# Patient Record
Sex: FEMALE | Race: WHITE | Employment: OTHER | URBAN - METROPOLITAN AREA
[De-identification: names, ages, dates, MRNs, and addresses within clinical notes are randomized per-mention and may not be internally consistent; named-entity substitution may affect disease eponyms.]

---

## 2023-09-05 ENCOUNTER — TELEPHONE (OUTPATIENT)
Dept: ORTHOPEDIC SURGERY | Age: 68
End: 2023-09-05

## 2023-09-05 ENCOUNTER — OFFICE VISIT (OUTPATIENT)
Dept: ORTHOPEDIC SURGERY | Age: 68
End: 2023-09-05

## 2023-09-05 DIAGNOSIS — M25.551 RIGHT HIP PAIN: Primary | ICD-10-CM

## 2023-09-05 DIAGNOSIS — M47.816 LUMBAR SPONDYLOSIS: ICD-10-CM

## 2023-09-05 DIAGNOSIS — M51.36 DDD (DEGENERATIVE DISC DISEASE), LUMBAR: ICD-10-CM

## 2023-09-05 DIAGNOSIS — M70.71 BURSITIS OF RIGHT HIP, UNSPECIFIED BURSA: ICD-10-CM

## 2023-09-05 RX ORDER — METHYLPREDNISOLONE ACETATE 40 MG/ML
80 INJECTION, SUSPENSION INTRA-ARTICULAR; INTRALESIONAL; INTRAMUSCULAR; SOFT TISSUE ONCE
Status: COMPLETED | OUTPATIENT
Start: 2023-09-05 | End: 2023-09-05

## 2023-09-05 RX ORDER — MELOXICAM 7.5 MG/1
7.5-15 TABLET ORAL DAILY
Qty: 60 TABLET | Refills: 1 | Status: SHIPPED | OUTPATIENT
Start: 2023-09-05

## 2023-09-05 RX ADMIN — METHYLPREDNISOLONE ACETATE 80 MG: 40 INJECTION, SUSPENSION INTRA-ARTICULAR; INTRALESIONAL; INTRAMUSCULAR; SOFT TISSUE at 14:32

## 2023-09-05 NOTE — TELEPHONE ENCOUNTER
Confirmed order is in her chart & has been faxed to [facility]. Will call her to schedule in a week or so, update our office if any issues getting scheduled

## 2023-09-05 NOTE — TELEPHONE ENCOUNTER
Please call pt about her mri,we need  to fax  a order to [facility].they told her we have to percert the mri to.

## 2023-09-05 NOTE — PROGRESS NOTES
Name: Piotr Mckeon  YOB: 1955  Gender: female  MRN: [id number]    CC:   Chief Complaint   Patient presents with    Hip Pain     Rt hip pain         HPI: Piotr Mckeon is a 75 y.o. healthy and active female with no significant PMHx here for evaluation of right hip pain  There was not an acute injury  Regarding the hip pain, it has been present for 3 months and is becoming worse. The pain is primarily lateral, over greater trochanter, posterior buttock area, and across the lower back with radiation down to the knee  she describes the pain as aching and throbbing  The pain is worse with going up and/or down stairs, inactivity, rising after sitting, and walking  The pain does radiate down the leg. she reports numbness down the leg. Treatment so far has been activity modification, Tylenol, ibuprofen, and oral steroids with little relief. She has also been going to see a chiropractor for regular adjustments, but has not seen much improvement with that at this point. She did have x-rays of the hip and lower back as well as CT scan of the hip which showed little pathology of the hip and degenerative findings of the lower back. She is scheduled to follow-up with the spine center in [facility] later this month regarding her back. Review of Systems  As per HPI. Pertinent positives and negatives are addressed with the patient, particularly those related to musculoskeletal concerns. Non-orthopaedic concerns were referred back to the primary care physician. Not on File                 PHYSICAL EXAMINATION:   The patient is alert and oriented, in no distress. There were no vitals filed for this visit. The cervical, thoracic and lumbar spine are without compromising deformity. The upper extremities demonstrate reasonable tone, strength and function bilaterally. The lower extremities are as described below. Circulation is normal with palpable pedal pulses bilaterally and no edema.   Skin